# Patient Record
Sex: FEMALE | Race: BLACK OR AFRICAN AMERICAN | NOT HISPANIC OR LATINO | URBAN - METROPOLITAN AREA
[De-identification: names, ages, dates, MRNs, and addresses within clinical notes are randomized per-mention and may not be internally consistent; named-entity substitution may affect disease eponyms.]

---
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
## 2023-08-29 ENCOUNTER — EMERGENCY (EMERGENCY)
Facility: HOSPITAL | Age: 23
LOS: 1 days | Discharge: HOME | End: 2023-08-29
Attending: EMERGENCY MEDICINE | Admitting: EMERGENCY MEDICINE
Payer: MEDICAID

## 2023-08-29 VITALS
HEART RATE: 82 BPM | OXYGEN SATURATION: 100 % | TEMPERATURE: 98 F | RESPIRATION RATE: 17 BRPM | DIASTOLIC BLOOD PRESSURE: 62 MMHG | SYSTOLIC BLOOD PRESSURE: 100 MMHG

## 2023-08-29 PROBLEM — Z78.9 OTHER SPECIFIED HEALTH STATUS: Chronic | Status: ACTIVE | Noted: 2020-10-06

## 2023-08-29 PROCEDURE — 99283 EMERGENCY DEPT VISIT LOW MDM: CPT

## 2023-08-29 RX ORDER — ACETAMINOPHEN 500 MG
650 TABLET ORAL ONCE
Refills: 0 | Status: COMPLETED | OUTPATIENT
Start: 2023-08-29 | End: 2023-08-29

## 2023-08-29 RX ADMIN — Medication 650 MILLIGRAM(S): at 11:06

## 2023-08-29 NOTE — ED PROVIDER NOTE - CLINICAL SUMMARY MEDICAL DECISION MAKING FREE TEXT BOX
Patient presents emergency department with signs of right-sided foot pain after walking.  Patient states she walks around crocs all day headache restStates pain worse with ambulation.  Over the arch.  Patient was hemodynamically stable and concern for possible planter fasciitis.  Patient educated to apply better shoes and Massage with a golf ball or medicineOrthotics.  Patient provided podiatry follow-up.  Patient understands measures for apply ice as needed.  Patient was educated to improve foot care.  Otherwise no overlying skin changes no distal pulse deficits.  Overall well-appearing

## 2023-08-29 NOTE — ED PROVIDER NOTE - OBJECTIVE STATEMENT
Healthy female presents with right base of foot pain.  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  During theStates pain is worse than of the day.  Right over the arch of the foot.  Patient denies any trauma has no significant swelling no calf pain or swelling.  No overall contraceptive pills.  No recent travel.  Patient states she is able to ambulate.  But does complain of pain at the arch of the right foot.

## 2023-08-29 NOTE — ED ADULT NURSE NOTE - NSFALLUNIVINTERV_ED_ALL_ED
Bed/Stretcher in lowest position, wheels locked, appropriate side rails in place/Call bell, personal items and telephone in reach/Instruct patient to call for assistance before getting out of bed/chair/stretcher/Non-slip footwear applied when patient is off stretcher/[place] to call system/Physically safe environment - no spills, clutter or unnecessary equipment/Purposeful proactive rounding/Room/bathroom lighting operational, light cord in reach

## 2023-08-29 NOTE — ED PROVIDER NOTE - MUSCULOSKELETAL, MLM
Spine appears normal, range of motion is not limited, +r medial foot muscle ttp, no overlying skin changes, distal pulses intact, neg joint tenderness

## 2023-08-29 NOTE — ED PROVIDER NOTE - NEURO NEGATIVE STATEMENT, MLM
no loss of consciousness, no gait abnormality, no headache, no sensory deficits, and no weakness. +r foot arch pain.

## 2023-08-29 NOTE — ED ADULT NURSE NOTE - OBJECTIVE STATEMENT
Pt received to int12 , awake and alert, A&OX4, ambulatory. C/oR foot pain on bottom of foot worsened by walking or flexion. States she was walking in the park yesterday for a few hours and believes wearing crocs may have caused the pain. Respirations even and unlabored. Denies injury, CP, SOB, N/V, HA, dizziness, palpitations, blurry vision. Bed in lowest position, call bell within reach. Safety maintained.

## 2023-08-29 NOTE — ED PROVIDER NOTE - PATIENT PORTAL LINK FT
You can access the FollowMyHealth Patient Portal offered by [facility] by registering at the following website: [email or web address]. By joining [facility]’s FollowMyHealth portal, you will also be able to view your health information using other applications (apps) compatible with our system.

## 2024-03-05 NOTE — ED PROVIDER NOTE - NS ED MD DISPO DISCHARGE CCDA
Patient/Caregiver provided printed discharge information.
Oriented - self; Oriented - place; Oriented - time

## 2025-02-26 NOTE — ED ADULT NURSE NOTE - VOIDING
Patient states she feels like she has food poisoning, started Monday with vomiting all day, yesterday, Tuesday she started abdominal cramping and diarrhea. States that she is still having the abdominal cramping and watery stools but has not been able to eat so they are less. Wants to know what PCP advises?    without difficulty